# Patient Record
Sex: MALE | Race: WHITE | Employment: FULL TIME | URBAN - METROPOLITAN AREA
[De-identification: names, ages, dates, MRNs, and addresses within clinical notes are randomized per-mention and may not be internally consistent; named-entity substitution may affect disease eponyms.]

---

## 2021-06-01 ENCOUNTER — HOSPITAL ENCOUNTER (OUTPATIENT)
Dept: PHYSICAL THERAPY | Age: 41
Discharge: HOME | End: 2021-06-01
Payer: COMMERCIAL

## 2021-06-01 PROCEDURE — 97140 MANUAL THERAPY 1/> REGIONS: CPT

## 2021-06-01 PROCEDURE — 97535 SELF CARE MNGMENT TRAINING: CPT

## 2021-06-01 PROCEDURE — 97162 PT EVAL MOD COMPLEX 30 MIN: CPT

## 2021-06-01 NOTE — PROGRESS NOTES
PHYSICAL THERAPY - DAILY TREATMENT NOTE    Patient Name: Liam Rivas        Date: 2021  : 1980   YES Patient  Verified  Visit #:      12  Insurance: Payor: Candis Seller / Plan: [facility] PPO / Product Type: PPO /      In time: 1:55 Out time: 2:40   Total Treatment Time: 45     Medicare Time Tracking (below)   Total Timed Codes (min):  45 1:1 Treatment Time:  45     TREATMENT AREA =  Low back pain [M54.5]    SUBJECTIVE  Pain Level (on 0 to 10 scale):    Medication Changes/New allergies or changes in medical history, any new surgeries or procedures? NO    If yes, update Summary List   Subjective Functional Status/Changes:  []  No changes reported     SEE IE          OBJECTIVE    15 min Manual Therapy: DTM L psoas, B RF, L QL, L IS ant inn, L1 L ERS, sciatic n. glide   Rationale:      decrease pain, increase ROM and increase tissue extensibility to improve patient's ability to perform ADLs    10 min Self Care: L Sciatic N. [place], tom, JHONY T/S Rot   Rationale:    increase ROM, increase strength and improve coordination to improve the patients ability to perform ADLs      Billed With/As:   [] TE   [] TA   [] Neuro   [x] Self Care Patient Education: [x] Review HEP    [] Progressed/Changed HEP based on:   [] positioning   [] body mechanics   [] transfers   [] heat/ice application    [] other:       min Patient Education:  YES  Reviewed HEP   []  Progressed/Changed HEP based on:         Other Objective/Functional Measures:    SEE IE     Post Treatment Pain Level (on 0 to 10) scale:       ASSESSMENT  Assessment/Changes in Function:     SEE IE     []  See Progress Note/Recertification   Patient will continue to benefit from skilled PT services to modify and progress therapeutic interventions, address functional mobility deficits, address ROM deficits, address strength deficits, analyze and address soft tissue restrictions, analyze and cue movement patterns, analyze and modify body mechanics/ergonomics and assess and modify postural abnormalities to attain remaining goals. Progress toward goals / Updated goals:         PLAN  []  Upgrade activities as tolerated YES Continue plan of care   []  Discharge due to :    []  Other:      Therapist: Elsi Thayer, PT, OCS, SCS, CSCS    Date: 6/1/2021 Time: 2:42 PM       No future appointments.

## 2021-06-01 NOTE — PROGRESS NOTES
[address] [place] [address] PHYSICAL THERAPY  [address] [place] [address] [address],[facility], [facility] - Phone: [phone]  Fax: [phone] / [phone]  Patient Name: Dunia Petit : 1980   Medical   Diagnosis: Low back pain [M54.5] Treatment Diagnosis: Low back pain [M54.5]   Onset Date:      Referral Source: Krissy Bruce MD Start of Care [facility]): 2021   Prior Hospitalization: See medical history Provider #: [id number]   Prior Level of Function: Pain free ADLs   Comorbidities: Hx of L4-5 disectomy   Medications: Verified on Patient Summary List   The Plan of Care and following information is based on the information from the initial evaluation.   ===========================================================================================  Assessment / key information:  Dunia Petit is a 39 y.o.  yo male with Dx of Low back pain [M54.5]. He reports having L4-5 Disectomy in 2019. He currently rates his pain as 10/10 at worst, 0/10 at best, primarily located at L lower lumbar region as well as lat aspect of his L LE. He complains of difficulty and increase pain with extending his L Knee in sitting. Objective Findings:  Lumbar ROM: Flx  = WNL, Ext = WNL, Rot: R = WNL, L = limited by 30%. Manual Muscle Testing: L hip Abd and Ext are Reduced. Lumbar/SI Screening:  L1 L ERS, L IS ant innominate. Slump Test:  + Bilaterally. Palpation:  Increase in soft tissue tension noted at L psoas.   Pt instructed in HEP and will f/u in clinic for PT.  ===========================================================================================  Eval Complexity: History MEDIUM  Complexity : 1-2 comorbidities / personal factors will impact the outcome/ POC ;  Examination  MEDIUM Complexity : 3 Standardized tests and measures addressing body structure, function, activity limitation and / or participation in recreation ; Presentation MEDIUM Complexity : Evolving with changing characteristics ; Decision Making LOW Complexity : FOTO score of ; Overall Complexity MEDIUM  Problem List: pain affecting function, decrease ROM, decrease strength, edema affecting function, impaired gait/ balance, decrease ADL/ functional abilitiies, decrease activity tolerance and decrease flexibility/ joint mobility   Treatment Plan may include any combination of the following: Therapeutic exercise, Therapeutic activities, Neuromuscular re-education, Physical agent/modality, Gait/balance training, Manual therapy, Patient education, Self Care training and Functional mobility training  Patient / Family readiness to learn indicated by: asking questions  Persons(s) to be included in education: patient (P)  Barriers to Learning/Limitations: None  Measures taken, if barriers to learning:    Patient Goal (s): Decrease pain   Patient self reported health status: good  Rehabilitation Potential: good     Short Term Goals: To be accomplished in  1-2  weeks:  1. Independent with HEP. 2. Decrease max pain 25-50% to assist with ADLs   Long Term Goals: To be accomplished in  3-4  weeks:  1. Decrease max pain 50-75% to assist with ADLs  2. Increase FOTO score to 81% to show functional improvment. 3.  Will rate  >/= +5 on Global Rating of Change and be prepared to DC to HEP. Frequency / Duration:   Patient to be seen  2-3  times per week for 3-4  weeks:  Patient / Caregiver education and instruction: self care and exercises    Therapist Signature: Mara Oscar, BONIFACIO, OCS, SCS, CSCS Date: 7/1/6219   Certification Period: na Time: 2:44 PM   ===========================================================================================  I certify that the above Physical Therapy Services are being furnished while the patient is under my care. I agree with the treatment plan and certify that this therapy is necessary.     Physician Signature:        Date:       Time: Shadi Barba MD  Please sign and return to [facility] at [facility], [facility]. or you may fax the signed copy to [phone]. Thank you.

## 2021-06-14 ENCOUNTER — HOSPITAL ENCOUNTER (OUTPATIENT)
Dept: PHYSICAL THERAPY | Age: 41
End: 2021-06-14
Payer: COMMERCIAL

## 2021-06-25 ENCOUNTER — HOSPITAL ENCOUNTER (OUTPATIENT)
Dept: PHYSICAL THERAPY | Age: 41
Discharge: HOME | End: 2021-06-25
Payer: COMMERCIAL

## 2021-06-25 PROCEDURE — 97140 MANUAL THERAPY 1/> REGIONS: CPT

## 2021-06-25 PROCEDURE — 97110 THERAPEUTIC EXERCISES: CPT

## 2021-06-25 NOTE — PROGRESS NOTES
PHYSICAL THERAPY - DAILY TREATMENT NOTE    Patient Name: Jeannie Johnson        Date: 2021  : 1980   yes Patient  Verified  Visit #:   2   of   12  Insurance: Payor: BLUE CROSS / Plan: [facility] PPO / Product Type: PPO /      In time: 7:00 Out time: 7:45   Total Treatment Time: 45     Medicare/[facility] Time Tracking (below)   Total Timed Codes (min):  45 1:1 Treatment Time:  45     TREATMENT AREA =  Low back pain [M54.5]  SUBJECTIVE  Pain Level (on 0 to 10 scale):  2  / 10   Medication Changes/New allergies or changes in medical history, any new surgeries or procedures?    no  If yes, update Summary List   Subjective Functional Status/Changes:  []  No changes reported     Just general sorenss. I feel like Im moving better          OBJECTIVE    10 min Therapeutic Exercise:  [x]  See flow sheet   Rationale:      increase ROM, increase strength and improve coordination to improve the patients ability to perform ADLs     35 min Manual Therapy: DTM L psoas, B RF, L QL, L IS ant inn, L1- L4 L ERS, sciatic n. glide   Rationale:      decrease pain, increase ROM and increase tissue extensibility to improve patient's ability to perform ADLs  The manual therapy interventions were performed at a separate and distinct time from the therapeutic activities interventions.       Billed With/As:   [] TE   [] TA   [] Neuro   [] Self Care Patient Education: [x] Review HEP    [] Progressed/Changed HEP based on:   [] positioning   [] body mechanics   [] transfers   [] heat/ice application    [] other:      Other Objective/Functional Measures:    Slight limitation in MS FLx at the end range  Cont to demonstrate limited fwd hip excursion with MS ext     Post Treatment Pain Level (on 0 to 10) scale:   0  / 10     ASSESSMENT  Assessment/Changes in Function:     Sig improved fwd hip excursion with MS ext after today's Rx     []  See Progress Note/Recertification   Patient will continue to benefit from skilled PT services to modify and progress therapeutic interventions, address functional mobility deficits and address ROM deficits to attain remaining goals. Progress toward goals / Updated goals:     Independent with HEP     PLAN  [x]  Upgrade activities as tolerated yes Continue plan of care   []  Discharge due to :    []  Other:      Therapist: Julien Villanueva PT    Date: 6/25/2021 Time: 6:47 AM     Future Appointments   Date Time Provider Fidencio Hernández   6/25/2021  7:00 AM Omar Todd, PT [facility]   6/28/2021  7:45 AM Omar Todd, PT [facility]   7/7/2021  7:45 AM Omar Todd, PT [phone]

## 2021-06-28 ENCOUNTER — HOSPITAL ENCOUNTER (OUTPATIENT)
Dept: PHYSICAL THERAPY | Age: 41
Discharge: HOME | End: 2021-06-28
Payer: COMMERCIAL

## 2021-06-28 PROCEDURE — 97110 THERAPEUTIC EXERCISES: CPT

## 2021-06-28 PROCEDURE — 97140 MANUAL THERAPY 1/> REGIONS: CPT

## 2021-06-28 NOTE — PROGRESS NOTES
[facility] THERAPY   [facility] [address],[facility], [address] [facility] [address] - Phone: [phone]  Fax: [phone]  PROGRESS NOTE  Patient Name: Kimberly Gupta : 1980   Treatment/Medical Diagnosis: Low back pain [M54.5]   Referral Source: Sheryle Reilly, MD     Date of Initial Visit: 21 Attended Visits: 4 Missed Visits: 0     SUMMARY OF TREATMENT  Kimberly Gupta has been seen at our clinicfor a total of 4 visits. Pt treatment has consisted of  therapeutic exercise for thoracic ROM, hip/core strengthening, and manual therapy(jt mobilization and deep tissue mobilization)  CURRENT STATUS  Pt has had a good tolerance to physical therapy treatment. He reports overall decrease in his pain level. He continues to demonstrate limited hip mobility with hip extension exercises. He continues to demonstrate slightly limted thoracic mobility which may be contributing to his LBP . Goal/Measure of Progress Goal Met? 1. Decrease Max pain by 50-75% to assist with ADLs   Status at last Eval: 10/10 Current Status: 3/10 yes   2. Increase FOTO score to 81 % show functional improvement   Status at last Eval: 75% Current Status: 80% progressing   3. Will rate >/= +5 on Global Rating of Change and be prepared to DC to HEP. Status at last Eval: na Current Status: +7 yes     New Goals to be achieved in __4__  weeks:  1. Cont with goal #2 above   2.     3.     RECOMMENDATIONS    Specifics: 2-3x/wk x 4 more wks  If you have any questions/comments please contact us directly at [phone]. Thank you for allowing us to assist in the care of your patient.     Therapist Signature: Yajaira Johnson, BONIFACIO, OCS, SCS, CSCS Date: 2021     Time: 12:43 PM   NOTE TO PHYSICIAN:  PLEASE COMPLETE THE ORDERS BELOW AND FAX TO   [facility]: [phone]  If you are unable to process this request in 24 hours please contact our office: [phone]    ___ I have read the above report and request that my patient continue as recommended.   ___ I have read the above report and request that my patient continue therapy with the following changes/special instructions:_________________________________________________________   ___ I have read the above report and request that my patient be discharged from therapy.      Physician Signature:        Date:       Time:

## 2021-06-28 NOTE — PROGRESS NOTES
PHYSICAL THERAPY - DAILY TREATMENT NOTE    Patient Name: Radha Cueto        Date: 2021  : 1980   yes Patient  Verified  Visit #:   3   of   12  Insurance: Payor: BLUE CROSS / Plan: [facility] PPO / Product Type: PPO /      In time: 7:45 Out time: 8:30   Total Treatment Time: 45     Medicare/[facility] Time Tracking (below)   Total Timed Codes (min):  45 1:1 Treatment Time:  45     TREATMENT AREA =  Low back pain [M54.5]  SUBJECTIVE  Pain Level (on 0 to 10 scale):  2  / 10   Medication Changes/New allergies or changes in medical history, any new surgeries or procedures?    no  If yes, update Summary List   Subjective Functional Status/Changes:  []  No changes reported     I traveled and slept on an uncomfortable bed so Im a little sore, but havent had the severe pain          OBJECTIVE  20 min Therapeutic Exercise:  [x]? See flow sheet   Rationale:      increase ROM, increase strength and improve coordination to improve the patients ability to perform ADLs      25 min Manual Therapy: DTM L psoas, B RF, L QL, L IS ant inn,  L4 L ERS, sciatic n. glide   Rationale:      decrease pain, increase ROM and increase tissue extensibility to improve patient's ability to perform ADLs  The manual therapy interventions were performed at a separate and distinct time from the therapeutic activities interventions.       Billed With/As:   [] TE   [] TA   [] Neuro   [] Self Care Patient Education: [x] Review HEP    [] Progressed/Changed HEP based on:   [] positioning   [] body mechanics   [] transfers   [] heat/ice application    [] other:      Other Objective/Functional Measures:    Slight IS ant inn noted on L      Post Treatment Pain Level (on 0 to 10) scale:   0  / 10     ASSESSMENT  Assessment/Changes in Function:     Presented with L scapular instability on L with QP UE/LE     []  See Progress Note/Recertification   Patient will continue to benefit from skilled PT services to modify and progress therapeutic interventions, address functional mobility deficits and address ROM deficits to attain remaining goals.    Progress toward goals / Updated goals:    Progressing with pain reduction      PLAN  [x]  Upgrade activities as tolerated yes Continue plan of care   []  Discharge due to :    []  Other:      Therapist: Lala Henry, PT    Date: 6/28/2021 Time: 6:44 AM     Future Appointments   Date Time Provider Fidencio Hernández   6/28/2021  7:45 AM Delmi Echeverria, PT [facility]   7/7/2021  7:45 AM Delmi Falling, PT [id number]

## 2021-07-07 ENCOUNTER — HOSPITAL ENCOUNTER (OUTPATIENT)
Dept: PHYSICAL THERAPY | Age: 41
Discharge: HOME | End: 2021-07-07
Payer: COMMERCIAL

## 2021-07-07 PROCEDURE — 97110 THERAPEUTIC EXERCISES: CPT

## 2021-07-07 PROCEDURE — 97140 MANUAL THERAPY 1/> REGIONS: CPT
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
## 2021-07-26 ENCOUNTER — HOSPITAL ENCOUNTER (OUTPATIENT)
Dept: PHYSICAL THERAPY | Age: 41
Discharge: HOME | End: 2021-07-26
Payer: COMMERCIAL

## 2021-07-26 PROCEDURE — 97110 THERAPEUTIC EXERCISES: CPT

## 2021-07-26 PROCEDURE — 97140 MANUAL THERAPY 1/> REGIONS: CPT
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
## 2021-08-02 ENCOUNTER — HOSPITAL ENCOUNTER (OUTPATIENT)
Dept: PHYSICAL THERAPY | Age: 41
Discharge: HOME | End: 2021-08-02
Payer: COMMERCIAL

## 2021-08-02 PROCEDURE — 97110 THERAPEUTIC EXERCISES: CPT

## 2021-08-02 PROCEDURE — 97140 MANUAL THERAPY 1/> REGIONS: CPT

## 2021-08-02 NOTE — PROGRESS NOTES
PHYSICAL THERAPY - DAILY TREATMENT NOTE    Patient Name: Ela Vázquez        Date: 2021  : 1980   yes Patient  Verified  Visit #:   5   of   12  Insurance: Payor: BLUE CROSS / Plan: [facility] PPO / Product Type: PPO /      In time: 7:00 Out time: 7:45   Total Treatment Time: 45     Medicare/[facility] Time Tracking (below)   Total Timed Codes (min):  45 1:1 Treatment Time:  45     TREATMENT AREA =  Low back pain [M54.5]  SUBJECTIVE  Pain Level (on 0 to 10 scale):  3   10   Medication Changes/New allergies or changes in medical history, any new surgeries or procedures?    no  If yes, update Summary List   Subjective Functional Status/Changes:  []  No changes reported     I just feel tight not too bad          OBJECTIVE  30 min Therapeutic Exercise:  [x]? ??  See flow sheet   Rationale:      increase ROM, increase strength and improve coordination to improve the patients ability to perform ADLs      15 min Manual Therapy: DTM L psoas, B RF, L QL, L IS ant inn, sciatic n. [place], L on R SI mohamud   Rationale:      decrease pain, increase ROM and increase tissue extensibility to improve patient's ability to perform ADLs  The manual therapy interventions were performed at a separate and distinct time from the therapeutic activities interventions.       Billed With/As:   [] TE   [] TA   [] Neuro   [] Self Care Patient Education: [x] Review HEP    [] Progressed/Changed HEP based on:   [] positioning   [] body mechanics   [] transfers   [] heat/ice application    [] other:      Other Objective/Functional Measures:    Difficulty engaging glutes with KB DL (limited post hip shift)     Post Treatment Pain Level (on 0 to 10) scale:   0  / 10     ASSESSMENT  Assessment/Changes in Function:     Sig improved post weight shift with KB DL     []  See Progress Note/Recertification   Patient will continue to benefit from skilled PT services to modify and progress therapeutic interventions, address functional mobility deficits and address ROM deficits to attain remaining goals.    Progress toward goals / Updated goals:    Progressing slowly with function      PLAN  [x]  Upgrade activities as tolerated yes Continue plan of care   []  Discharge due to :    []  Other:      Therapist: Karla Goldberg, PT    Date: 8/2/2021 Time: 6:46 AM     Future Appointments   Date Time Provider Fidencio Hernández   8/2/2021  7:00 AM Trey Archer, PT [id number]

## 2021-08-23 ENCOUNTER — HOSPITAL ENCOUNTER (OUTPATIENT)
Dept: PHYSICAL THERAPY | Age: 41
End: 2021-08-23
Payer: COMMERCIAL

## 2021-09-16 NOTE — PROGRESS NOTES
[facility] PHYSICAL THERAPY  [address] [place] [address] [address],Eliza Lockhart, [facility] - Phone: [phone]  Fax: [phone] SUMMARY  Patient Name: Neil Gates : 1980   Treatment/Medical Diagnosis: Low back pain [M54.5]   Referral Source: Markos Benitez MD       Date of Initial Visit: 21 Attended Visits: 6 Missed Visits: 3      SUMMARY OF TREATMENT  Neil Gates has been seen at our clinicfor a total of 6 visits. Pt treatment has consisted of  therapeutic exercise for thoracic ROM, hip/core strengthening, and manual therapy(jt mobilization and deep tissue mobilization)  CURRENT STATUS    Pt has had a good tolerance to physical therapy treatment. He reports overall decrease in his pain level and now complains more of soreness. He has improved with his hip mobility into extension and was navya to lift 50lbs kettlebell without increase in pain. He missed multiple scheduled appointment and did not return to PT treatment after the 6th visit.                Goal/Measure of Progress Goal Met? 1. Decrease Max pain by 50-75% to assist with ADLs   Status at last Eval: 10/10 Current Status: 3/10 yes   2. Increase FOTO score to 81 % show functional improvement   Status at last Eval: 75% Current Status: 80% progressing   3. Will rate >/= +5 on Global Rating of Change and be prepared to DC to HEP. Status at last Eval: na Current Status: +7 yes        RECOMMENDATIONS  Discharge from physical therapy treatment with HEP. If you have any questions/comments please contact us directly at [phone]. Thank you for allowing us to assist in the care of your patient.     Therapist Signature: Ondina Faust DPT, OCS, SCS, CSCS Date: 2021     Time: 2:27 PM